# Patient Record
Sex: FEMALE | Race: WHITE | URBAN - METROPOLITAN AREA
[De-identification: names, ages, dates, MRNs, and addresses within clinical notes are randomized per-mention and may not be internally consistent; named-entity substitution may affect disease eponyms.]

---

## 2017-08-21 ENCOUNTER — HOSPITAL ENCOUNTER (EMERGENCY)
Facility: CLINIC | Age: 22
Discharge: HOME | End: 2017-08-21
Attending: EMERGENCY MEDICINE | Admitting: EMERGENCY MEDICINE
Payer: COMMERCIAL

## 2017-08-21 VITALS
OXYGEN SATURATION: 100 % | WEIGHT: 113 LBS | BODY MASS INDEX: 17.74 KG/M2 | HEIGHT: 67 IN | TEMPERATURE: 98.5 F | RESPIRATION RATE: 16 BRPM | DIASTOLIC BLOOD PRESSURE: 80 MMHG | SYSTOLIC BLOOD PRESSURE: 115 MMHG | HEART RATE: 77 BPM

## 2017-08-21 DIAGNOSIS — K60.2 ANAL FISSURE: ICD-10-CM

## 2017-08-21 PROCEDURE — 99284 EMERGENCY DEPT VISIT MOD MDM: CPT

## 2017-08-21 PROCEDURE — 46600 DIAGNOSTIC ANOSCOPY SPX: CPT

## 2017-08-21 RX ORDER — NORETHINDRONE ACETATE AND ETHINYL ESTRADIOL 1; 5 MG/1; UG/1
1 TABLET ORAL DAILY
COMMUNITY

## 2017-08-21 RX ORDER — ASPIRIN 81 MG
100 TABLET, DELAYED RELEASE (ENTERIC COATED) ORAL DAILY
Qty: 60 TABLET | Refills: 1 | Status: SHIPPED | OUTPATIENT
Start: 2017-08-21

## 2017-08-21 ASSESSMENT — ENCOUNTER SYMPTOMS
BLOOD IN STOOL: 0
RECTAL PAIN: 0
ANAL BLEEDING: 1
ABDOMINAL PAIN: 0
VOMITING: 0
HEMATURIA: 0
NAUSEA: 0

## 2017-08-21 NOTE — ED PROVIDER NOTES
History     Chief Complaint:  Rectal Bleeding     HPI   Chloe Peter is a 22 year old female who presents for evaluation of rectal bleeding. Recently the patient has been training in [employer] and has not been drinking as much water as she normally would and has been having abnormally hard bowel movements. However, she has continued to have daily bowel movements, as she regularly does. On 8/18/2017, the patient had an episode of bright red rectal bleeding following a regular bowel movement. She did not have any apparent blood mixed in with her stool. Since then, she has had three additional episodes of rectal bleeding with bowel movements, prompting her to seek evaluation in the ED. Otherwise, she has not had any abdominal pain, nausea, vomiting, rectal pain, nose bleeding, vaginal bleeding, or hematuria recently. Her last menstrual period was approximately a month ago and her next menstrual period should begin tomorrow.     Allergies:  NKDA      Medications:    norethindrone-ethinyl estradiol (FEMHRT 1/5) 1-5 MG-MCG per tablet    Past Medical History:    The patient denies any relevant past medical history.     Past Surgical History:    Tonsillectomy     Family History:    History reviewed. No pertinent family history.     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   The patient currently lives with her parents in [place], [place].   The patient's primary care clinic is [facility] in [place].     Review of Systems   HENT: Negative for nosebleeds.    Gastrointestinal: Positive for anal bleeding. Negative for abdominal pain, blood in stool, nausea, rectal pain and vomiting.   Genitourinary: Negative for hematuria and vaginal bleeding.   All other systems reviewed and are negative.    Physical Exam   First Vitals:  BP:  "126/72  Pulse: 77  Temp: 98.5  F (36.9  C)  Resp: 16  Height: 170.2 cm (5' 7\")  Weight: 51.3 kg (113 lb)  SpO2: 100 %      Physical Exam  General: Resting comfortably on the gurney  Head:  The scalp, face, and head appear normal  Neck:  Normal range of motion  Abdomen:  Normal.   Rectal:  Anal fissure at 12 o'clock. No active bleeding.     Anoscopic exam did not show any internal hemorrhoids or bleeding.   Neuro:  Speech is normal and fluent. Moves all 4 extremities.   Psych:  Awake. Alert.  Normal affect.      Appropriate interactions    Emergency Department Course     Emergency Department Course:  Nursing notes and vitals reviewed.  1526: I performed an exam of the patient as documented above.     Findings and plan explained to the Patient. Patient discharged home with instructions regarding supportive care, medications, and reasons to return. The importance of close follow-up was reviewed. The patient was prescribed Colace.      Impression & Plan      Medical Decision Making:  Chloe Peter is a 22 year old female who comes in with bright red blood per rectum while having bowel movements that are brown. This has going on for the last few days. She's had this happen in the past but to a lesser amount. 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 She has an anal fissure at 12 o'clock which is not actively bleeding and no sign of internal hemorrhoids by anoscopy. I believe that this certainly could cause the symptoms she is describing. We discussed increasing fluids and increased fiber in the diet and using some Colace for stool softener until resolved.    Assessment anal fissure disposition home discharge sections Colace 100 PO BID. increase fluids and fibrous follow-up as needed and     Assessment:    ICD-10-CM   1. Anal fissure K60.2       Disposition:  Home.     Discharge " Instructions:  Colace 100 p.o. b.i.d. Increase fluids and fibers. Follow-up as needed.    Discharge Medications:  New Prescriptions    DOCUSATE SODIUM (COLACE) 100 MG TABLET    Take 100 mg by mouth daily       IBrent, am serving as a scribe at 3:26 PM on 8/21/2017 to document services personally performed by Dr. Mcdonnell, based on my observations and the provider's statements to me.     EMERGENCY DEPARTMENT       Keren Mcdonnell MD  08/21/17 2019

## 2017-08-21 NOTE — ED AVS SNAPSHOT
Emergency Department    [address] [facility] [address]    Phone:  [phone]    Fax:  [phone]                                       Chloe Peter   MRN: [id number]    Department:   Emergency Department   Date of Visit:  8/21/2017           Patient Information     Date Of Birth          1995        Your diagnoses for this visit were:     Anal fissure        You were seen by Keren Mcdonnell MD.      Follow-up Information     Please follow up.    Why:  [facility] [place] as needed.         Discharge Instructions       Increase fluids. Increase fiber. Use stool softener until area heals up.     24 Hour Appointment Hotline       To make an appointment at any [facility], call [phone] ([phone]). If you don't have a family doctor or clinic, we will help you find one. [facility] clinics are conveniently located to serve the needs of you and your family.             Review of your medicines      START taking        Dose / Directions Last dose taken    docusate sodium 100 MG tablet   Commonly known as:  COLACE   Dose:  100 mg   Quantity:  60 tablet        Take 100 mg by mouth daily   Refills:  1          Our records show that you are taking the medicines listed below. If these are incorrect, please call your family doctor or clinic.        Dose / Directions Last dose taken    norethindrone-ethinyl estradiol 1-5 MG-MCG per tablet   Commonly known as:  FEMHRT 1/5   Dose:  1 tablet        Take 1 tablet by mouth daily   Refills:  0                Prescriptions were sent or printed at these locations (1 Prescription)                   Other Prescriptions                Printed at Department/Unit printer (1 of 1)         docusate sodium (COLACE) 100 MG tablet                Orders Needing Specimen Collection     None      Pending Results     No orders found from 8/19/2017 to 8/22/2017.            Pending Culture Results     No orders found from 8/19/2017  to 8/22/2017.            Pending Results Instructions     If you had any lab results that were not finalized at the time of your Discharge, you can call the ED Lab Result RN at [phone]. You will be contacted by this team for any positive Lab results or changes in treatment. The nurses are available 7 days a week from 10A to 6:30P.  You can leave a message 24 hours per day and they will return your call.        Test Results From Your Hospital Stay               Clinical Quality Measure: Blood Pressure Screening     Your blood pressure was checked while you were in the emergency department today. The last reading we obtained was  BP: 126/72 . Please read the guidelines below about what these numbers mean and what you should do about them.  If your systolic blood pressure (the top number) is less than 120 and your diastolic blood pressure (the bottom number) is less than 80, then your blood pressure is normal. There is nothing more that you need to do about it.  If your systolic blood pressure (the top number) is 120-139 or your diastolic blood pressure (the bottom number) is 80-89, your blood pressure may be higher than it should be. You should have your blood pressure rechecked within a year by a primary care provider.  If your systolic blood pressure (the top number) is 140 or greater or your diastolic blood pressure (the bottom number) is 90 or greater, you may have high blood pressure. High blood pressure is treatable, but if left untreated over time it can put you at risk for heart attack, stroke, or kidney failure. You should have your blood pressure rechecked by a primary care provider within the next 4 weeks.  If your provider in the emergency department today gave you specific instructions to follow-up with your doctor or provider even sooner than that, you should follow that instruction and not wait for up to 4 weeks for your follow-up visit.        Thank you for choosing [facility]       Thank you for  "choosing [facility] for your care. Our goal is always to provide you with excellent care. Hearing back from our patients is one way we can continue to improve our services. Please take a few minutes to complete the written survey that you may receive in the mail after you visit with us. Thank you!        [employer] Information     [employer] lets you send messages to your doctor, view your test results, renew your prescriptions, schedule appointments and more. To sign up, go to [email or web address] . Click on \"Log in\" on the left side of the screen, which will take you to the Welcome page. Then click on \"Sign up Now\" on the right side of the page.     You will be asked to enter the access code listed below, as well as some personal information. Please follow the directions to create your username and password.     Your access code is: [email or web address]  Expires: 2017  3:52 PM     Your access code will  in 90 days. If you need help or a new code, please call your [facility] clinic or [phone].        Care EveryWhere ID     This is your Care EveryWhere ID. This could be used by other organizations to access your [facility] medical records  [id number]        Equal Access to Services     IVETTE PHAM : William Gutierrez, wamalikda fina, qaybta kaalmada adedaniella, vangie sepulveda. So [facility] [phone].    ATENCIÓN: Si habla español, tiene a santos disposición servicios gratuitos de asistencia lingüística. Llame al [phone].    We comply with applicable federal civil rights laws and [place] laws. We do not discriminate on the basis of race, color, national origin, age, disability sex, sexual orientation or gender identity.            After Visit Summary       This is your record. Keep this with you and show to your community pharmacist(s) and doctor(s) at your next visit.                  "